# Patient Record
(demographics unavailable — no encounter records)

---

## 2025-07-15 NOTE — IMAGING
[de-identified] :   ----------------------------------------------------------------------------   Left knee exam:   Skin: no significant pertinent finding  Inspection:     (+mild) Effusion     (neg) Malalignment     (neg) Swelling     (neg) Quad atrophy     (neg) J-sign  ROM:     0 - 135 degrees of flexion.  Tenderness:     (neg) MJLT     (+) LJLT     (neg) Medial patellar facet tenderness     (neg) Lateral patellar facet tenderness     (neg) Crepitus     (neg) Patellar grind tenderness     (neg) Patellar tendon     (neg) Quad tendon     Other:  Stability:     (neg) Lachman     (neg) Varus/Valgus instability     (neg) Posterior drawer     (neg) Patellar translation: wnl  Additional tests:     (neg) McMurrays test     (neg) Patellar apprehension     Other:  Strength: 5/5 Q/H/TA/GS/EHL  Neuro: In tact to light touch throughout, DTR's wnl  Vascularity: Extremity warm and well perfused  Gait: normal    [Left] : left knee [All Views] : anteroposterior, lateral, skyline, and anteroposterior standing [There are no fractures, subluxations or dislocations. No significant abnormalities are seen] : There are no fractures, subluxations or dislocations. No significant abnormalities are seen [Mild patellofemoral OA] : Mild patellofemoral OA

## 2025-07-15 NOTE — HISTORY OF PRESENT ILLNESS
[de-identified] : This is Ms. DANA HERBERT  a 57 year old female who comes in today complaining of left knee pain for 5 days with no injury.     Date of injury/onset - 7/10/25 Area of injury/pain - left knee Mechanism of injury/pain - none. recently walking more than usual since gettnig a back.  Worst level of pain (out of 10) - 5 Type of pain (sharp, dull, mechanical) - ache Any Instability -  Work or school sports injury - Improves pain/worsens pain - walking makes it worse. standing Previous treatments (NSAIDS/Tylenol/Physical therapy/Home exercise/RICE) - heat Previous injuries to area -   Occupaiton/School -  Currently (working/out of work/retired) -   PMH -    GI disease/Ulcers/Reflux - none   Kidney disease/Renal insufficiency - none   Diabetes?  A1c? - none   Cardiac/Pulmonary disease - none   Hypertension - yes controlled   Other -   PSH -   Smoking/Alcohol/Drugs - non smoker

## 2025-07-15 NOTE — DISCUSSION/SUMMARY
[Medication Risks Reviewed] : Medication risks reviewed [de-identified] : plan for PT differential lmt naproxen. fu 6-8w    ----------------------------------------------------------------------------   Patient warned of specific risks of medication related to bleeding, GI issues, increase blood pressure, and cardiac risks in addition to additional risks.  Patient advised to discuss with PMD  if any presence of stated issues.   ----------------------------------------------------------------------------   All relevant imaging studies pertinent to today's visit, including x-rays, MRI's and/or other advanced imaging studies (CT/etc) were independently interpreted and reviewed with the patient as needed. Implications of the studies together with the patient's clinical picture were discussed to formulate a working diagnosis and management options were detailed.   The patient and/or guardian was advised of the diagnosis.  The natural history of the pathology was explained in full. All questions were answered.  The risks and benefits of conservative and interventional treatment alternatives were explained to the patient   The patient and/or guardian was advised if any advanced diagnostic/imaging study (MRI/CT/etc) is ordered to evaluate potential pathology in the affected area(s), they should follow up in the office to review the results of the study and determine further management that may be indicated.